# Patient Record
Sex: MALE | Race: WHITE | NOT HISPANIC OR LATINO | Employment: FULL TIME | URBAN - METROPOLITAN AREA
[De-identification: names, ages, dates, MRNs, and addresses within clinical notes are randomized per-mention and may not be internally consistent; named-entity substitution may affect disease eponyms.]

---

## 2020-01-06 ENCOUNTER — HOSPITAL ENCOUNTER (EMERGENCY)
Facility: CLINIC | Age: 30
Discharge: HOME | End: 2020-01-06
Attending: FAMILY MEDICINE | Admitting: FAMILY MEDICINE
Payer: COMMERCIAL

## 2020-01-06 VITALS
TEMPERATURE: 98.2 F | WEIGHT: 260 LBS | RESPIRATION RATE: 16 BRPM | HEART RATE: 103 BPM | OXYGEN SATURATION: 97 % | SYSTOLIC BLOOD PRESSURE: 156 MMHG | DIASTOLIC BLOOD PRESSURE: 110 MMHG

## 2020-01-06 DIAGNOSIS — E03.9 HYPOTHYROIDISM, UNSPECIFIED TYPE: ICD-10-CM

## 2020-01-06 DIAGNOSIS — M25.50 POLYARTHRALGIA: ICD-10-CM

## 2020-01-06 LAB
ALBUMIN SERPL-MCNC: 4.4 G/DL (ref 3.4–5)
ALBUMIN UR-MCNC: NEGATIVE MG/DL
ALP SERPL-CCNC: 90 U/L (ref 40–150)
ALT SERPL W P-5'-P-CCNC: 46 U/L (ref 0–70)
ANION GAP SERPL CALCULATED.3IONS-SCNC: 5 MMOL/L (ref 3–14)
APPEARANCE UR: CLEAR
AST SERPL W P-5'-P-CCNC: 28 U/L (ref 0–45)
BASOPHILS # BLD AUTO: 0 10E9/L (ref 0–0.2)
BASOPHILS NFR BLD AUTO: 0.2 %
BILIRUB SERPL-MCNC: 0.5 MG/DL (ref 0.2–1.3)
BILIRUB UR QL STRIP: NEGATIVE
BUN SERPL-MCNC: 15 MG/DL (ref 7–30)
CALCIUM SERPL-MCNC: 9.3 MG/DL (ref 8.5–10.1)
CHLORIDE SERPL-SCNC: 106 MMOL/L (ref 94–109)
CO2 SERPL-SCNC: 26 MMOL/L (ref 20–32)
COLOR UR AUTO: YELLOW
CREAT SERPL-MCNC: 0.95 MG/DL (ref 0.66–1.25)
CRP SERPL-MCNC: 13.3 MG/L (ref 0–8)
DEPRECATED S PYO AG THROAT QL EIA: NORMAL
DIFFERENTIAL METHOD BLD: NORMAL
EOSINOPHIL # BLD AUTO: 0 10E9/L (ref 0–0.7)
EOSINOPHIL NFR BLD AUTO: 0.5 %
ERYTHROCYTE [DISTWIDTH] IN BLOOD BY AUTOMATED COUNT: 12.5 % (ref 10–15)
ERYTHROCYTE [SEDIMENTATION RATE] IN BLOOD BY WESTERGREN METHOD: 14 MM/H (ref 0–15)
GFR SERPL CREATININE-BSD FRML MDRD: >90 ML/MIN/{1.73_M2}
GLUCOSE SERPL-MCNC: 100 MG/DL (ref 70–99)
GLUCOSE UR STRIP-MCNC: NEGATIVE MG/DL
HCT VFR BLD AUTO: 43.5 % (ref 40–53)
HGB BLD-MCNC: 14.9 G/DL (ref 13.3–17.7)
HGB UR QL STRIP: ABNORMAL
IMM GRANULOCYTES # BLD: 0 10E9/L (ref 0–0.4)
IMM GRANULOCYTES NFR BLD: 0.3 %
KETONES UR STRIP-MCNC: NEGATIVE MG/DL
LACTATE BLD-SCNC: 0.8 MMOL/L (ref 0.7–2)
LEUKOCYTE ESTERASE UR QL STRIP: NEGATIVE
LYMPHOCYTES # BLD AUTO: 1.6 10E9/L (ref 0.8–5.3)
LYMPHOCYTES NFR BLD AUTO: 24.9 %
MCH RBC QN AUTO: 30.7 PG (ref 26.5–33)
MCHC RBC AUTO-ENTMCNC: 34.3 G/DL (ref 31.5–36.5)
MCV RBC AUTO: 90 FL (ref 78–100)
MONOCYTES # BLD AUTO: 0.6 10E9/L (ref 0–1.3)
MONOCYTES NFR BLD AUTO: 8.4 %
MUCOUS THREADS #/AREA URNS LPF: PRESENT /LPF
NEUTROPHILS # BLD AUTO: 4.3 10E9/L (ref 1.6–8.3)
NEUTROPHILS NFR BLD AUTO: 65.7 %
NITRATE UR QL: NEGATIVE
NRBC # BLD AUTO: 0 10*3/UL
NRBC BLD AUTO-RTO: 0 /100
PH UR STRIP: 5 PH (ref 5–7)
PLATELET # BLD AUTO: 214 10E9/L (ref 150–450)
POTASSIUM SERPL-SCNC: 3.9 MMOL/L (ref 3.4–5.3)
PROT SERPL-MCNC: 8.3 G/DL (ref 6.8–8.8)
RBC # BLD AUTO: 4.86 10E12/L (ref 4.4–5.9)
RBC #/AREA URNS AUTO: 1 /HPF (ref 0–2)
SODIUM SERPL-SCNC: 137 MMOL/L (ref 133–144)
SOURCE: ABNORMAL
SP GR UR STRIP: 1.02 (ref 1–1.03)
SPECIMEN SOURCE: NORMAL
SQUAMOUS #/AREA URNS AUTO: <1 /HPF (ref 0–1)
T4 FREE SERPL-MCNC: 0.74 NG/DL (ref 0.76–1.46)
TSH SERPL DL<=0.005 MIU/L-ACNC: 16.4 MU/L (ref 0.4–4)
URATE SERPL-MCNC: 4.6 MG/DL (ref 3.5–7.2)
UROBILINOGEN UR STRIP-MCNC: 0 MG/DL (ref 0–2)
WBC # BLD AUTO: 6.6 10E9/L (ref 4–11)
WBC #/AREA URNS AUTO: <1 /HPF (ref 0–5)

## 2020-01-06 PROCEDURE — 87081 CULTURE SCREEN ONLY: CPT | Performed by: FAMILY MEDICINE

## 2020-01-06 PROCEDURE — 99284 EMERGENCY DEPT VISIT MOD MDM: CPT

## 2020-01-06 PROCEDURE — 83605 ASSAY OF LACTIC ACID: CPT | Performed by: FAMILY MEDICINE

## 2020-01-06 PROCEDURE — 86140 C-REACTIVE PROTEIN: CPT | Performed by: FAMILY MEDICINE

## 2020-01-06 PROCEDURE — 85652 RBC SED RATE AUTOMATED: CPT | Performed by: FAMILY MEDICINE

## 2020-01-06 PROCEDURE — 86431 RHEUMATOID FACTOR QUANT: CPT | Performed by: FAMILY MEDICINE

## 2020-01-06 PROCEDURE — 87880 STREP A ASSAY W/OPTIC: CPT | Performed by: FAMILY MEDICINE

## 2020-01-06 PROCEDURE — 86376 MICROSOMAL ANTIBODY EACH: CPT | Performed by: FAMILY MEDICINE

## 2020-01-06 PROCEDURE — 87040 BLOOD CULTURE FOR BACTERIA: CPT | Mod: XS | Performed by: FAMILY MEDICINE

## 2020-01-06 PROCEDURE — 84550 ASSAY OF BLOOD/URIC ACID: CPT | Performed by: FAMILY MEDICINE

## 2020-01-06 PROCEDURE — 84439 ASSAY OF FREE THYROXINE: CPT | Performed by: FAMILY MEDICINE

## 2020-01-06 PROCEDURE — 81001 URINALYSIS AUTO W/SCOPE: CPT | Performed by: FAMILY MEDICINE

## 2020-01-06 PROCEDURE — 85025 COMPLETE CBC W/AUTO DIFF WBC: CPT | Performed by: FAMILY MEDICINE

## 2020-01-06 PROCEDURE — 86747 PARVOVIRUS ANTIBODY: CPT | Performed by: FAMILY MEDICINE

## 2020-01-06 PROCEDURE — 86038 ANTINUCLEAR ANTIBODIES: CPT | Performed by: FAMILY MEDICINE

## 2020-01-06 PROCEDURE — 84443 ASSAY THYROID STIM HORMONE: CPT | Performed by: FAMILY MEDICINE

## 2020-01-06 PROCEDURE — 86039 ANTINUCLEAR ANTIBODIES (ANA): CPT | Performed by: FAMILY MEDICINE

## 2020-01-06 PROCEDURE — 80053 COMPREHEN METABOLIC PANEL: CPT | Performed by: FAMILY MEDICINE

## 2020-01-06 PROCEDURE — 99284 EMERGENCY DEPT VISIT MOD MDM: CPT | Mod: Z6 | Performed by: FAMILY MEDICINE

## 2020-01-06 RX ORDER — LEVOTHYROXINE SODIUM 88 UG/1
88 TABLET ORAL DAILY
Qty: 30 TABLET | Refills: 0 | Status: SHIPPED | OUTPATIENT
Start: 2020-01-06 | End: 2020-02-05

## 2020-01-06 ASSESSMENT — ENCOUNTER SYMPTOMS
DIAPHORESIS: 0
HEADACHES: 0
DIARRHEA: 0
SORE THROAT: 1
CONSTIPATION: 0
BLOOD IN STOOL: 0
SHORTNESS OF BREATH: 0
FREQUENCY: 0
WHEEZING: 0
FATIGUE: 1
SINUS PRESSURE: 0
FEVER: 0
PALPITATIONS: 0
ARTHRALGIAS: 1
VOMITING: 0
COUGH: 0
NAUSEA: 0
CHILLS: 1
DYSURIA: 0
ABDOMINAL PAIN: 0

## 2020-01-06 NOTE — ED AVS SNAPSHOT
[facility]  [address] [address]  Phone:  [phone]  Fax:  [phone]                                    Rishi Mata   MRN: [id number]    Department:  [facility]   Date of Visit:  1/6/2020           After Visit Summary Signature Page    I have received my discharge instructions, and my questions have been answered. I have discussed any challenges I see with this plan with the nurse or doctor.    ..........................................................................................................................................  Patient/Patient Representative Signature      ..........................................................................................................................................  Patient Representative Print Name and Relationship to Patient    ..................................................               ................................................  Date                                   Time    ..........................................................................................................................................  Reviewed by Signature/Title    ...................................................              ..............................................  Date                                               Time          22EPIC Rev 08/18

## 2020-01-07 LAB
ANA PAT SER IF-IMP: ABNORMAL
ANA SER QL IF: POSITIVE
ANA TITR SER IF: ABNORMAL {TITER}
RHEUMATOID FACT SER NEPH-ACNC: NORMAL IU/ML (ref 0–20)
THYROPEROXIDASE AB SERPL-ACNC: <10 IU/ML

## 2020-01-07 NOTE — ED PROVIDER NOTES
History     Chief Complaint   Patient presents with     Joint Swelling     joint pain and swelling since Dec 30th, sx getting worse, increasing pain     HPI  Rishi Mata is a 29 year old male who presents with joint pain affecting the wrists, shoulders, elbows, knees, ankles - onset dec 30th after flu-like illness dec 20th - chills, but no fever.  no URI symptoms at the time. chronically fatigued, works long hours.  no persistent cold intolerance.  no change in bowel habits.      He has noted that the wrist and MCP joints feel particularly tight and more difficult to close with a sense of joint swelling in these particular joints without overlying erythema.    No known history of gouty arthritis, rheumatoid arthritis, lupus.  He is not on no medications.  No known Lyme's disease or risk factors given middle of winter.  No travel history.  Denies STD exposures such as gonorrhea or syphilis.  No history of diabetes heart disease lung disease hepatitis other underlying systemic conditions.  Denies associated fever.  No skin lesions.  No rashes been noted.   He has no known exposures to ill children (e.g. Parvovirus B19)  pharyngitis present for 5 days and resolved 2-3 days ago. was not seen for this.  occl alcohol. No drugs including no IV drugs.      Allergies:  No Known Allergies    Problem List:    There are no active problems to display for this patient.       Past Medical History:    No past medical history on file.    Past Surgical History:    No past surgical history on file.    Family History:    No family history on file.    Social History:  Marital Status:  Single [1]  Social History     Tobacco Use     Smoking status: Not on file   Substance Use Topics     Alcohol use: Not on file     Drug use: Not on file        Medications:    No current outpatient medications on file.        Review of Systems   Constitutional: Positive for chills and fatigue. Negative for diaphoresis and fever.   HENT: Positive for sore  throat. Negative for ear pain and sinus pressure.    Eyes: Negative for visual disturbance.   Respiratory: Negative for cough, shortness of breath and wheezing.    Cardiovascular: Negative for chest pain and palpitations.   Gastrointestinal: Negative for abdominal pain, blood in stool, constipation, diarrhea, nausea and vomiting.   Genitourinary: Negative for dysuria, frequency and urgency.   Musculoskeletal: Positive for arthralgias.   Skin: Negative for rash.   Neurological: Negative for headaches.   All other systems reviewed and are negative.      Physical Exam   BP: (!) 149/108  Pulse: 79  Temp: 98.1  F (36.7  C)  Resp: 16  Weight: 117.9 kg (260 lb)  SpO2: 97 %      Physical Exam  Constitutional:       General: He is in acute distress.   HENT:      Nose: Nose normal. No rhinorrhea.      Mouth/Throat:      Mouth: Mucous membranes are moist.      Pharynx: Posterior oropharyngeal erythema (mild) present.   Eyes:      Conjunctiva/sclera: Conjunctivae normal.   Neck:      Musculoskeletal: Normal range of motion and neck supple.      Thyroid: No thyromegaly or thyroid tenderness.   Cardiovascular:      Rate and Rhythm: Normal rate and regular rhythm.      Heart sounds: No murmur. No friction rub. No gallop.    Pulmonary:      Effort: Pulmonary effort is normal. No respiratory distress.      Breath sounds: Normal breath sounds. No stridor. No wheezing or rhonchi.   Abdominal:      General: Abdomen is flat. Bowel sounds are normal. There is no distension.      Palpations: There is no mass.      Tenderness: There is no abdominal tenderness.      Hernia: No hernia is present.   Musculoskeletal:      Right lower leg: No edema.      Left lower leg: No edema.   Skin:     Coloration: Skin is not pale.      Findings: No rash.   Neurological:      Mental Status: He is alert.       The patient appears to have reduced range of motion on  but on my exam this does not appear to be focal in any of the joints but more  generalized.  He however notes more swelling around the second MCP joint on bilateral hands.  There may be some doughy feeling around this joint as compared with others but this would be minimal.  I see no overlying erythema or rash over the dorsal surface of the knuckles.  Erythematous none of the joints appear to be.  There is no psoriatic plaque.  The wrist similarly do not demonstrate obvious significant swelling although landmarks are more difficult to visualize.  He appears to be more uncomfortable on ambulation with some pain at the knees bilaterally.  No distal skin lesions on the extremities such as Janeway lesions.          ED Course        Procedures               Critical Care time:  none               Results for orders placed or performed during the hospital encounter of 01/06/20 (from the past 24 hour(s))   Blood culture   Result Value Ref Range    Specimen Description Blood Right Arm     Special Requests Aerobic and anaerobic bottles received     Culture Micro No growth after 7 hours    Blood culture   Result Value Ref Range    Specimen Description Blood Left Arm     Special Requests Aerobic and anaerobic bottles received     Culture Micro No growth after 7 hours    CBC with platelets differential   Result Value Ref Range    WBC 6.6 4.0 - 11.0 10e9/L    RBC Count 4.86 4.4 - 5.9 10e12/L    Hemoglobin 14.9 13.3 - 17.7 g/dL    Hematocrit 43.5 40.0 - 53.0 %    MCV 90 78 - 100 fl    MCH 30.7 26.5 - 33.0 pg    MCHC 34.3 31.5 - 36.5 g/dL    RDW 12.5 10.0 - 15.0 %    Platelet Count 214 150 - 450 10e9/L    Diff Method Automated Method     % Neutrophils 65.7 %    % Lymphocytes 24.9 %    % Monocytes 8.4 %    % Eosinophils 0.5 %    % Basophils 0.2 %    % Immature Granulocytes 0.3 %    Nucleated RBCs 0 0 /100    Absolute Neutrophil 4.3 1.6 - 8.3 10e9/L    Absolute Lymphocytes 1.6 0.8 - 5.3 10e9/L    Absolute Monocytes 0.6 0.0 - 1.3 10e9/L    Absolute Eosinophils 0.0 0.0 - 0.7 10e9/L    Absolute Basophils 0.0 0.0  - 0.2 10e9/L    Abs Immature Granulocytes 0.0 0 - 0.4 10e9/L    Absolute Nucleated RBC 0.0    Comprehensive metabolic panel   Result Value Ref Range    Sodium 137 133 - 144 mmol/L    Potassium 3.9 3.4 - 5.3 mmol/L    Chloride 106 94 - 109 mmol/L    Carbon Dioxide 26 20 - 32 mmol/L    Anion Gap 5 3 - 14 mmol/L    Glucose 100 (H) 70 - 99 mg/dL    Urea Nitrogen 15 7 - 30 mg/dL    Creatinine 0.95 0.66 - 1.25 mg/dL    GFR Estimate >90 >60 mL/min/[1.73_m2]    GFR Estimate If Black >90 >60 mL/min/[1.73_m2]    Calcium 9.3 8.5 - 10.1 mg/dL    Bilirubin Total 0.5 0.2 - 1.3 mg/dL    Albumin 4.4 3.4 - 5.0 g/dL    Protein Total 8.3 6.8 - 8.8 g/dL    Alkaline Phosphatase 90 40 - 150 U/L    ALT 46 0 - 70 U/L    AST 28 0 - 45 U/L   Erythrocyte sedimentation rate auto   Result Value Ref Range    Sed Rate 14 0 - 15 mm/h   CRP Inflammation   Result Value Ref Range    CRP Inflammation 13.3 (H) 0.0 - 8.0 mg/L   Uric acid   Result Value Ref Range    Uric Acid 4.6 3.5 - 7.2 mg/dL   TSH with free T4 reflex   Result Value Ref Range    TSH 16.40 (H) 0.40 - 4.00 mU/L   Lactic acid whole blood   Result Value Ref Range    Lactic Acid 0.8 0.7 - 2.0 mmol/L   T4 free   Result Value Ref Range    T4 Free 0.74 (L) 0.76 - 1.46 ng/dL   Rapid Strep Screen   Result Value Ref Range    Specimen Description Throat     Rapid Strep A Screen       NEGATIVE: No Group A streptococcal antigen detected by immunoassay, await culture report.   Beta strep group A culture   Result Value Ref Range    Specimen Description Throat     Special Requests Specimen collected in eSwab transport (white cap)     Culture Micro PENDING    UA with Microscopic   Result Value Ref Range    Color Urine Yellow     Appearance Urine Clear     Glucose Urine Negative NEG^Negative mg/dL    Bilirubin Urine Negative NEG^Negative    Ketones Urine Negative NEG^Negative mg/dL    Specific Gravity Urine 1.020 1.003 - 1.035    Blood Urine Small (A) NEG^Negative    pH Urine 5.0 5.0 - 7.0 pH    Protein  Albumin Urine Negative NEG^Negative mg/dL    Urobilinogen mg/dL 0.0 0.0 - 2.0 mg/dL    Nitrite Urine Negative NEG^Negative    Leukocyte Esterase Urine Negative NEG^Negative    Source Unspecified Urine     WBC Urine <1 0 - 5 /HPF    RBC Urine 1 0 - 2 /HPF    Squamous Epithelial /HPF Urine <1 0 - 1 /HPF    Mucous Urine Present (A) NEG^Negative /LPF       Medications - No data to display    Assessments & Plan (with Medical Decision Making)     MDM: Rishi Mata is a 29 year old male who presented with polyarthritis/polyarthraligia symptoms -and especially over the wrist and MCP joints but also involving the shoulders, knees, elbows, ankles.  He did have recent illness that was brief in mid December and also pharyngitis recently but no other significant systemic symptoms.  Does note fatigue chronically that he attributes to his hours at work.  He has no underlying rheumatic conditions such as rheumatoid arthritis, lupus.  No underlying systemic conditions such as psoriasis, diabetes heart or lung disease.  No obvious exposures to STD such as gonorrhea, hiv or syphilis.  When the middle of winter making Lyme's less likely - I did not due lymes testing but could be considered, but I did not add this once I had the elevated TSH, low T4.    He has had recent febrile illness and pharyngitis and strep is negative.  He has no travel history.  Denies illicit drugs.      a broad-based evaluation was initiated and he does have an increased TSH to 16 and a suppressed T4 below the normal range.  This could be consistent with a Hashimoto's thyroiditis which can also be associated with rheumatic conditions including rheumatoid arthritis.   I therefore added a peroxidase antibody to evaluate for Hashimotos.  I have initiated Synthroid at 88 mcg which is approximately 1.6 mcg/kg.   This could be responsible for all of his symptoms.  However he has no other thyroid related therefore history and I have asked him to follow-up later this  week with primary care for when the remainder of his labs are back and they can at that time decide if he is to follow-up with endocrinology or with rheumatology.           I have reviewed the nursing notes.    I have reviewed the findings, diagnosis, plan and need for follow up with the patient.       New Prescriptions    No medications on file       Final diagnoses:   Polyarthralgia - this appears related to low thyroid - possibly due to hashimotos thyroiditis which can be associated with arthritis conditions.   start synthroid and follow-up clinic within 1 week.  after labs are fully back, primary provider can decide whether you should see rheumatology or endocrinology.   Hypothyroidism, unspecified type       1/6/2020   [facility] EMERGENCY DEPARTMENT     Andres Almonte MD  01/07/20 1042

## 2020-01-07 NOTE — DISCHARGE INSTRUCTIONS
ICD-10-CM    1. Polyarthralgia M25.50     this appears related to low thyroid - possibly due to hashimotos thyroiditis which can be associated with arthritis conditions.   start synthroid and follow-up clinic within 1 week.  after labs are fully back, primary provider can decide whether you should see rheumatology or endocrinology.   2. Hypothyroidism, unspecified type E03.9

## 2020-01-07 NOTE — ED TRIAGE NOTES
Pt here via private vehicle for evaluation of joint pain and swelling. Reports joint pain starting new years and then swelling on Friday. Denies ever having this before. No PMH. Denies other associated sx.

## 2020-01-08 LAB — B19V IGM SER IA-ACNC: 0.14 IV

## 2020-01-08 NOTE — RESULT ENCOUNTER NOTE
Appt with Paula Barrow, AZALIA CNP on 1/10/2020.  Routed result    Flaquito Ballard RN  [facility]  Emergency Dept Lab Result RN  Ph# [phone]

## 2020-01-09 LAB
BACTERIA SPEC CULT: NORMAL
Lab: NORMAL
SPECIMEN SOURCE: NORMAL

## 2020-01-09 NOTE — RESULT ENCOUNTER NOTE
Final Beta strep group A r/o culture is NEGATIVE for Group A streptococcus.    No treatment or change in treatment per [place] Strep protocol.

## 2020-01-10 ENCOUNTER — OFFICE VISIT (OUTPATIENT)
Dept: FAMILY MEDICINE | Facility: CLINIC | Age: 30
End: 2020-01-10
Payer: COMMERCIAL

## 2020-01-10 VITALS
HEIGHT: 73 IN | HEART RATE: 65 BPM | BODY MASS INDEX: 34.59 KG/M2 | SYSTOLIC BLOOD PRESSURE: 120 MMHG | DIASTOLIC BLOOD PRESSURE: 78 MMHG | OXYGEN SATURATION: 98 % | TEMPERATURE: 96.8 F | WEIGHT: 261 LBS

## 2020-01-10 DIAGNOSIS — M06.4 INFLAMMATORY POLYARTHROPATHY (H): Primary | ICD-10-CM

## 2020-01-10 LAB
T4 FREE SERPL-MCNC: 0.78 NG/DL (ref 0.76–1.46)
TSH SERPL DL<=0.005 MIU/L-ACNC: 8.53 MU/L (ref 0.4–4)

## 2020-01-10 PROCEDURE — 84443 ASSAY THYROID STIM HORMONE: CPT | Performed by: NURSE PRACTITIONER

## 2020-01-10 PROCEDURE — 84439 ASSAY OF FREE THYROXINE: CPT | Performed by: NURSE PRACTITIONER

## 2020-01-10 PROCEDURE — 99213 OFFICE O/P EST LOW 20 MIN: CPT | Performed by: NURSE PRACTITIONER

## 2020-01-10 PROCEDURE — 36415 COLL VENOUS BLD VENIPUNCTURE: CPT | Performed by: NURSE PRACTITIONER

## 2020-01-10 ASSESSMENT — ENCOUNTER SYMPTOMS
ENDOCRINE NEGATIVE: 1
RESPIRATORY NEGATIVE: 1
JOINT SWELLING: 1
CARDIOVASCULAR NEGATIVE: 1
NECK STIFFNESS: 1
ARTHRALGIAS: 1
FATIGUE: 1

## 2020-01-10 ASSESSMENT — MIFFLIN-ST. JEOR: SCORE: 2192.77

## 2020-01-10 NOTE — PATIENT INSTRUCTIONS
1. Recheck thyroid labs today. We'll call you with results.  2. Rheumatology referral sent to 2 clinics.   3. Continue synthroid as prescribed.  4. Follow-up if symptoms worsen or do not start to improve.

## 2020-01-10 NOTE — PROGRESS NOTES
"Subjective     Rishi Mata is a 29 year old male who presents to clinic today for the following health issues:    HPI   ED/UC Followup:    Facility:  [facility]  Date of visit: 1/6/2020  Reason for visit: joint pains/ polyarthralgia  Hands, knees, ankles, shoulders  Current Status: the same as Monday-  Patient states this has been a bad week  Please review blood test results.  Started levothyroxine Rx'd 1/6- is taking every day     In addition: knees, hands, feet, ankles, hips pain and swelling. Had been sore before in the past, but not like this.      There is no problem list on file for this patient.    History reviewed. No pertinent surgical history.    Social History     Tobacco Use     Smoking status: Never Smoker     Smokeless tobacco: Former User     Types: Chew   Substance Use Topics     Alcohol use: Yes     Comment: 2 beers per month     History reviewed. No pertinent family history.      Current Outpatient Medications   Medication Sig Dispense Refill     levothyroxine (SYNTHROID/LEVOTHROID) 88 MCG tablet Take 1 tablet (88 mcg) by mouth daily 30 tablet 0     No Known Allergies  BP Readings from Last 3 Encounters:   01/10/20 120/78   01/06/20 (!) 156/110    Wt Readings from Last 3 Encounters:   01/10/20 118.4 kg (261 lb)   01/06/20 117.9 kg (260 lb)            Reviewed and updated as needed this visit by Provider  Paula Barrow, SHANAE, NP-C 1/10/2020 4:21 PM            Review of Systems   Constitutional: Positive for fatigue.   Respiratory: Negative.    Cardiovascular: Negative.    Endocrine: Negative.    Musculoskeletal: Positive for arthralgias, joint swelling and neck stiffness.        Knees, hands, feet, ankles, hips are painful at times            Objective    /78 (BP Location: Right arm, Cuff Size: Adult Large)   Pulse 65   Temp 96.8  F (36  C) (Tympanic)   Ht 1.854 m (6' 1\")   Wt 118.4 kg (261 lb)   SpO2 98%   BMI 34.43 kg/m    Body mass index is 34.43 kg/m .  Physical Exam "       Diagnostic Test Results:  Labs reviewed in Epic  No results found for this or any previous visit (from the past 24 hour(s)).        Assessment & Plan     1. Inflammatory polyarthropathy (H)  Slight improvement in symptoms today. Recheck TSH to see if synthroid is helping. Will call with results. Recommended he establish care with PCP; options given. Referral to rheumatology made. Continue synthroid as prescribed.     - TSH with free T4 reflex  - RHEUMATOLOGY REFERRAL       Return in about 1 week (around 1/17/2020), or if symptoms worsen or fail to improve.    AZALIA [facility]

## 2020-01-12 LAB
BACTERIA SPEC CULT: NO GROWTH
BACTERIA SPEC CULT: NO GROWTH
Lab: NORMAL
Lab: NORMAL
SPECIMEN SOURCE: NORMAL
SPECIMEN SOURCE: NORMAL

## 2020-01-22 ENCOUNTER — OFFICE VISIT (OUTPATIENT)
Dept: FAMILY MEDICINE | Facility: CLINIC | Age: 30
End: 2020-01-22
Payer: COMMERCIAL

## 2020-01-22 VITALS
SYSTOLIC BLOOD PRESSURE: 134 MMHG | DIASTOLIC BLOOD PRESSURE: 71 MMHG | WEIGHT: 258 LBS | OXYGEN SATURATION: 98 % | HEIGHT: 73 IN | HEART RATE: 71 BPM | BODY MASS INDEX: 34.19 KG/M2

## 2020-01-22 DIAGNOSIS — E03.9 HYPOTHYROIDISM, UNSPECIFIED TYPE: ICD-10-CM

## 2020-01-22 DIAGNOSIS — M06.4 INFLAMMATORY POLYARTHROPATHY (H): Primary | ICD-10-CM

## 2020-01-22 PROCEDURE — 99214 OFFICE O/P EST MOD 30 MIN: CPT | Performed by: FAMILY MEDICINE

## 2020-01-22 RX ORDER — PREDNISONE 20 MG/1
TABLET ORAL
Qty: 20 TABLET | Refills: 0 | Status: SHIPPED | OUTPATIENT
Start: 2020-01-22

## 2020-01-22 ASSESSMENT — MIFFLIN-ST. JEOR: SCORE: 2179.16

## 2020-01-22 NOTE — PROGRESS NOTES
Subjective     Rishi Mata is a 29 year old male who presents to clinic today for the following health issues:    HPI   ED/UC Followup:    Facility:  [facility]  Date of visit: 1/10/20  Reason for visit: Polyarthralgia   Current Status: getting worse, in a lot of pain- would like to be tested for lymes disease     Establish Care    Joint Pain    Onset: about 3 months ago    Description:   Location: left shoulder, right shoulder, left elbow, right elbow, left wrist, right wrist, left knee, right knee, left hip, right hip, left ankle, right ankle and all finger joints  Character: Sharp, Dull ache and Gnawing    Intensity: severe    Progression of Symptoms: same    Accompanying Signs & Symptoms:  Other symptoms: swelling; wakes up in the morning all fingers curled flexed and difficult to move - gets better as days progresses    History:   Previous similar pain: no       Precipitating factors:   Trauma or overuse: no     Alleviating factors:  Improved by: nothing    Therapies Tried and outcome: no treatment for the inflammation or pain yet.    Patient was started on levothyroxine 2 weeks ago.        There is no problem list on file for this patient.    History reviewed. No pertinent surgical history.    Social History     Tobacco Use     Smoking status: Never Smoker     Smokeless tobacco: Former User     Types: Chew   Substance Use Topics     Alcohol use: Yes     Comment: 2 beers per month     History reviewed. No pertinent family history.      Current Outpatient Medications   Medication Sig Dispense Refill     levothyroxine (SYNTHROID/LEVOTHROID) 88 MCG tablet Take 1 tablet (88 mcg) by mouth daily 30 tablet 0     predniSONE (DELTASONE) 20 MG tablet Take 3 tabs by mouth daily x 3 days, then 2 tabs daily x 3 days, then 1 tab daily x 3 days, then 1/2 tab daily x 3 days. 20 tablet 0     No Known Allergies      Reviewed and updated as needed this visit by Provider         Review of Systems   ROS COMP: Constitutional, HEENT,  "cardiovascular, pulmonary, GI, , musculoskeletal, neuro, skin, endocrine and psych systems are negative, except as otherwise noted.      Objective    /71 (BP Location: Right arm, Patient Position: Sitting, Cuff Size: Adult Large)   Pulse 71   Ht 1.854 m (6' 1\")   Wt 117 kg (258 lb)   SpO2 98%   BMI 34.04 kg/m    Body mass index is 34.04 kg/m .  Physical Exam   GEN: alert, oriented x 3, NAD, ambulatory with no assist, appears uncomfortable walking  NECK: no swelling; diffuse SCM TTP bilaterally, thyroid not palpable and non-tender  MUSC: mild swelling of all MCP and IPJs with moderate TTP; TTP of bilateral shoulders, elbows, hips, knees, ankles, MTPs; no finger nodules; no tophi  SKIN: no rash      Diagnostic Test Results:  Labs reviewed in Epic        Assessment & Plan     Rishi was seen today for er f/u.    Diagnoses and all orders for this visit:    Inflammatory polyarthropathy (H)  -     predniSONE (DELTASONE) 20 MG tablet; Take 3 tabs by mouth daily x 3 days, then 2 tabs daily x 3 days, then 1 tab daily x 3 days, then 1/2 tab daily x 3 days.  -     RHEUMATOLOGY REFERRAL  Patient was advised based on hi shistory, findings on exam, and positive KATHRYN, he has inflammatory polyarthritis.  DDx: seronegative RA, lupus, other autoimmune disorder, sjorgen's syndrome.  Patient has not been given any prednisone or antiinflammatory treatment to date. Hence, expected his pain persists.  Discussed use of prednisone, including benefits in his condition, and posible ADR to med. He concurred to proceed with treatment.  Consult rheumatology.  Patient asked about lyme disease testing - advised patient his clinical picture is not highly suspect or consistent with lyme disease. Testing will be deferred.  Return precautions discussed and given to patient.    Hypothyroidism, unspecified type  -     TSH; Future  -     T4 FREE; Future  Check labs 1 month after he started med.  Consider referral to rheumatology.  This is likely " a separate condition from the above, or could be concurrent result of an autoimmune process?  Consider testing thyroid autoantibodies. Consider thyroid US - thyroid non-palpable and non-tender today.  Return precautions discussed and given to patient.           Patient Instructions   Start prednisone to treat the inflammatory arthritis.    If not better after 1 week, see provider again.        No follow-ups on file.    Chong De Paz MD  [facility]

## 2020-01-22 NOTE — PATIENT INSTRUCTIONS
Start prednisone to treat the inflammatory arthritis.    If not better after 1 week, see provider again.

## 2020-01-23 ENCOUNTER — TELEPHONE (OUTPATIENT)
Dept: RHEUMATOLOGY | Facility: CLINIC | Age: 30
End: 2020-01-23

## 2020-01-23 NOTE — TELEPHONE ENCOUNTER
[facility] Call Center    Phone Message    May a detailed message be left on voicemail: yes    Reason for Call: Other: Pt has incoming referral for  Inflammatory polyarthropathy. Pt was explained the intake process. Please advise     Action Taken: Message routed to:  [facility] ([facility] Rheum

## 2020-01-24 NOTE — TELEPHONE ENCOUNTER
Patient is referred by Chong De Paz for inflammatory polyarthropathy.     Per referring OV notes, patient complains of joint pain for 3 months in multiple areas and describes as a dull ache.     There is no swelling upon exam.    Component      Latest Ref Rng & Units 1/6/2020   KATHRYN interpretation      NEG:Negative Positive (A)   KATHRYN pattern 1       HOMOGENEOUS   KATHRYN titer 1       1:640   Sed Rate      0 - 15 mm/h 14   CRP Inflammation      0.0 - 8.0 mg/L 13.3 (H)   Rheumatoid Factor      <12 IU/mL <20. REFERENCE RANGE: <20. ASSAYED BY ALTERNATE METHOD.   Uric Acid      3.5 - 7.2 mg/dL 4.6     Sending chart to Rheumatologist for review process which can take 1-2 weeks.     Violeta Gastelum, MILVIA   1/24/2020 3:18 PM

## 2020-01-27 PROBLEM — E03.9 HYPOTHYROIDISM, UNSPECIFIED TYPE: Status: ACTIVE | Noted: 2020-01-27

## 2020-01-27 NOTE — TELEPHONE ENCOUNTER
Per Rheumatologist review, ok to schedule patient with Abhilash. Task sent to schedulers to contact patient to offer an appointment.     Violeta Gastelum CMA   1/27/2020 8:59 AM

## 2020-01-30 ENCOUNTER — TRANSFERRED RECORDS (OUTPATIENT)
Dept: HEALTH INFORMATION MANAGEMENT | Facility: CLINIC | Age: 30
End: 2020-01-30

## 2020-02-03 DIAGNOSIS — E03.9 HYPOTHYROIDISM, UNSPECIFIED TYPE: ICD-10-CM

## 2020-02-03 LAB
T4 FREE SERPL-MCNC: 0.97 NG/DL (ref 0.76–1.46)
TSH SERPL DL<=0.005 MIU/L-ACNC: 3.34 MU/L (ref 0.4–4)

## 2020-02-03 PROCEDURE — 84439 ASSAY OF FREE THYROXINE: CPT | Performed by: FAMILY MEDICINE

## 2020-02-03 PROCEDURE — 36415 COLL VENOUS BLD VENIPUNCTURE: CPT | Performed by: FAMILY MEDICINE

## 2020-02-03 PROCEDURE — 84443 ASSAY THYROID STIM HORMONE: CPT | Performed by: FAMILY MEDICINE

## 2020-02-17 DIAGNOSIS — M12.89 TRANSIENT ARTHROPATHY, MULTIPLE SITES: ICD-10-CM

## 2020-02-17 DIAGNOSIS — Z79.52 LONG TERM CURRENT USE OF SYSTEMIC STEROIDS: Primary | ICD-10-CM

## 2020-02-17 LAB
ALT SERPL W P-5'-P-CCNC: 52 U/L (ref 0–70)
AST SERPL W P-5'-P-CCNC: 25 U/L (ref 0–45)
BASOPHILS # BLD AUTO: 0 10E9/L (ref 0–0.2)
BASOPHILS NFR BLD AUTO: 0.2 %
CREAT SERPL-MCNC: 0.94 MG/DL (ref 0.66–1.25)
CRP SERPL-MCNC: 9.9 MG/L (ref 0–8)
DIFFERENTIAL METHOD BLD: ABNORMAL
EOSINOPHIL # BLD AUTO: 0 10E9/L (ref 0–0.7)
EOSINOPHIL NFR BLD AUTO: 0 %
ERYTHROCYTE [DISTWIDTH] IN BLOOD BY AUTOMATED COUNT: 12.6 % (ref 10–15)
GFR SERPL CREATININE-BSD FRML MDRD: >90 ML/MIN/{1.73_M2}
HCT VFR BLD AUTO: 44.7 % (ref 40–53)
HGB BLD-MCNC: 15 G/DL (ref 13.3–17.7)
IMM GRANULOCYTES # BLD: 0.1 10E9/L (ref 0–0.4)
IMM GRANULOCYTES NFR BLD: 0.4 %
LYMPHOCYTES # BLD AUTO: 0.7 10E9/L (ref 0.8–5.3)
LYMPHOCYTES NFR BLD AUTO: 6 %
MCH RBC QN AUTO: 30.4 PG (ref 26.5–33)
MCHC RBC AUTO-ENTMCNC: 33.6 G/DL (ref 31.5–36.5)
MCV RBC AUTO: 91 FL (ref 78–100)
MONOCYTES # BLD AUTO: 0.4 10E9/L (ref 0–1.3)
MONOCYTES NFR BLD AUTO: 3 %
NEUTROPHILS # BLD AUTO: 10.6 10E9/L (ref 1.6–8.3)
NEUTROPHILS NFR BLD AUTO: 90.4 %
NRBC # BLD AUTO: 0 10*3/UL
NRBC BLD AUTO-RTO: 0 /100
PLATELET # BLD AUTO: 253 10E9/L (ref 150–450)
RBC # BLD AUTO: 4.93 10E12/L (ref 4.4–5.9)
WBC # BLD AUTO: 11.7 10E9/L (ref 4–11)

## 2020-02-17 PROCEDURE — 86200 CCP ANTIBODY: CPT | Performed by: INTERNAL MEDICINE

## 2020-02-17 PROCEDURE — 82565 ASSAY OF CREATININE: CPT | Performed by: INTERNAL MEDICINE

## 2020-02-17 PROCEDURE — 84460 ALANINE AMINO (ALT) (SGPT): CPT | Performed by: INTERNAL MEDICINE

## 2020-02-17 PROCEDURE — 85025 COMPLETE CBC W/AUTO DIFF WBC: CPT | Performed by: INTERNAL MEDICINE

## 2020-02-17 PROCEDURE — 86618 LYME DISEASE ANTIBODY: CPT | Performed by: INTERNAL MEDICINE

## 2020-02-17 PROCEDURE — 84450 TRANSFERASE (AST) (SGOT): CPT | Performed by: INTERNAL MEDICINE

## 2020-02-17 PROCEDURE — 36415 COLL VENOUS BLD VENIPUNCTURE: CPT | Performed by: INTERNAL MEDICINE

## 2020-02-17 PROCEDURE — 86140 C-REACTIVE PROTEIN: CPT | Performed by: INTERNAL MEDICINE

## 2020-02-18 LAB
B BURGDOR IGG+IGM SER QL: 0.1 (ref 0–0.89)
CCP AB SER IA-ACNC: 1 U/ML

## 2020-03-11 ENCOUNTER — HEALTH MAINTENANCE LETTER (OUTPATIENT)
Age: 30
End: 2020-03-11

## 2020-07-02 ENCOUNTER — MEDICAL CORRESPONDENCE (OUTPATIENT)
Dept: HEALTH INFORMATION MANAGEMENT | Facility: CLINIC | Age: 30
End: 2020-07-02

## 2020-07-02 DIAGNOSIS — Z79.52 LONG TERM CURRENT USE OF SYSTEMIC STEROIDS: Primary | ICD-10-CM

## 2020-07-02 DIAGNOSIS — M12.89 TRANSIENT ARTHROPATHY, MULTIPLE SITES: ICD-10-CM

## 2020-07-07 DIAGNOSIS — M12.89 TRANSIENT ARTHROPATHY, MULTIPLE SITES: ICD-10-CM

## 2020-07-07 DIAGNOSIS — Z79.52 LONG TERM CURRENT USE OF SYSTEMIC STEROIDS: ICD-10-CM

## 2020-07-07 LAB
ALT SERPL W P-5'-P-CCNC: 86 U/L (ref 0–70)
AST SERPL W P-5'-P-CCNC: 59 U/L (ref 0–45)
BASOPHILS # BLD AUTO: 0 10E9/L (ref 0–0.2)
BASOPHILS NFR BLD AUTO: 0.1 %
CREAT SERPL-MCNC: 0.84 MG/DL (ref 0.66–1.25)
CRP SERPL-MCNC: <2.9 MG/L (ref 0–8)
DIFFERENTIAL METHOD BLD: NORMAL
EOSINOPHIL # BLD AUTO: 0 10E9/L (ref 0–0.7)
EOSINOPHIL NFR BLD AUTO: 0.3 %
ERYTHROCYTE [DISTWIDTH] IN BLOOD BY AUTOMATED COUNT: 13.6 % (ref 10–15)
GFR SERPL CREATININE-BSD FRML MDRD: >90 ML/MIN/{1.73_M2}
HCT VFR BLD AUTO: 43.3 % (ref 40–53)
HGB BLD-MCNC: 15.4 G/DL (ref 13.3–17.7)
LYMPHOCYTES # BLD AUTO: 2 10E9/L (ref 0.8–5.3)
LYMPHOCYTES NFR BLD AUTO: 22.5 %
MCH RBC QN AUTO: 31.2 PG (ref 26.5–33)
MCHC RBC AUTO-ENTMCNC: 35.6 G/DL (ref 31.5–36.5)
MCV RBC AUTO: 88 FL (ref 78–100)
MONOCYTES # BLD AUTO: 0.4 10E9/L (ref 0–1.3)
MONOCYTES NFR BLD AUTO: 4.4 %
NEUTROPHILS # BLD AUTO: 6.5 10E9/L (ref 1.6–8.3)
NEUTROPHILS NFR BLD AUTO: 72.7 %
PLATELET # BLD AUTO: 219 10E9/L (ref 150–450)
RBC # BLD AUTO: 4.93 10E12/L (ref 4.4–5.9)
WBC # BLD AUTO: 9 10E9/L (ref 4–11)

## 2020-07-07 PROCEDURE — 85025 COMPLETE CBC W/AUTO DIFF WBC: CPT | Performed by: INTERNAL MEDICINE

## 2020-07-07 PROCEDURE — 86140 C-REACTIVE PROTEIN: CPT | Performed by: INTERNAL MEDICINE

## 2020-07-07 PROCEDURE — 82565 ASSAY OF CREATININE: CPT | Performed by: INTERNAL MEDICINE

## 2020-07-07 PROCEDURE — 84460 ALANINE AMINO (ALT) (SGPT): CPT | Performed by: INTERNAL MEDICINE

## 2020-07-07 PROCEDURE — 84450 TRANSFERASE (AST) (SGOT): CPT | Performed by: INTERNAL MEDICINE

## 2020-07-07 PROCEDURE — 36415 COLL VENOUS BLD VENIPUNCTURE: CPT | Performed by: INTERNAL MEDICINE

## 2020-07-14 ENCOUNTER — TRANSFERRED RECORDS (OUTPATIENT)
Dept: HEALTH INFORMATION MANAGEMENT | Facility: CLINIC | Age: 30
End: 2020-07-14

## 2020-09-23 ENCOUNTER — MEDICAL CORRESPONDENCE (OUTPATIENT)
Dept: HEALTH INFORMATION MANAGEMENT | Facility: CLINIC | Age: 30
End: 2020-09-23

## 2020-10-08 ENCOUNTER — TRANSFERRED RECORDS (OUTPATIENT)
Dept: HEALTH INFORMATION MANAGEMENT | Facility: CLINIC | Age: 30
End: 2020-10-08

## 2020-10-16 DIAGNOSIS — Z79.899 ENCOUNTER FOR LONG-TERM (CURRENT) USE OF MEDICATIONS: ICD-10-CM

## 2020-10-16 DIAGNOSIS — Z79.899 ENCOUNTER FOR LONG-TERM (CURRENT) USE OF MEDICATIONS: Primary | ICD-10-CM

## 2020-10-16 DIAGNOSIS — M06.9 RHEUMATIC JOINT DISEASE (H): ICD-10-CM

## 2020-10-16 DIAGNOSIS — Z79.52 LONG TERM CURRENT USE OF SYSTEMIC STEROIDS: ICD-10-CM

## 2020-10-16 LAB
ALT SERPL W P-5'-P-CCNC: 33 U/L (ref 0–70)
AST SERPL W P-5'-P-CCNC: 24 U/L (ref 0–45)
CREAT SERPL-MCNC: 0.96 MG/DL (ref 0.66–1.25)
CRP SERPL-MCNC: <2.9 MG/L (ref 0–8)
ERYTHROCYTE [DISTWIDTH] IN BLOOD BY AUTOMATED COUNT: 12 % (ref 10–15)
GFR SERPL CREATININE-BSD FRML MDRD: >90 ML/MIN/{1.73_M2}
HCT VFR BLD AUTO: 43.6 % (ref 40–53)
HGB BLD-MCNC: 15.3 G/DL (ref 13.3–17.7)
MCH RBC QN AUTO: 30.9 PG (ref 26.5–33)
MCHC RBC AUTO-ENTMCNC: 35.1 G/DL (ref 31.5–36.5)
MCV RBC AUTO: 88 FL (ref 78–100)
PLATELET # BLD AUTO: 164 10E9/L (ref 150–450)
RBC # BLD AUTO: 4.95 10E12/L (ref 4.4–5.9)
WBC # BLD AUTO: 6.5 10E9/L (ref 4–11)

## 2020-10-16 PROCEDURE — 86140 C-REACTIVE PROTEIN: CPT | Performed by: INTERNAL MEDICINE

## 2020-10-16 PROCEDURE — 84460 ALANINE AMINO (ALT) (SGPT): CPT | Performed by: INTERNAL MEDICINE

## 2020-10-16 PROCEDURE — 85027 COMPLETE CBC AUTOMATED: CPT | Performed by: INTERNAL MEDICINE

## 2020-10-16 PROCEDURE — 86481 TB AG RESPONSE T-CELL SUSP: CPT | Performed by: INTERNAL MEDICINE

## 2020-10-16 PROCEDURE — 36415 COLL VENOUS BLD VENIPUNCTURE: CPT | Performed by: INTERNAL MEDICINE

## 2020-10-16 PROCEDURE — 84450 TRANSFERASE (AST) (SGOT): CPT | Performed by: INTERNAL MEDICINE

## 2020-10-16 PROCEDURE — 82565 ASSAY OF CREATININE: CPT | Performed by: INTERNAL MEDICINE

## 2020-10-21 LAB
GAMMA INTERFERON BACKGROUND BLD IA-ACNC: 0.05 IU/ML
M TB IFN-G CD4+ BCKGRND COR BLD-ACNC: 9.95 IU/ML
M TB TUBERC IFN-G BLD QL: NEGATIVE
MITOGEN IGNF BCKGRD COR BLD-ACNC: 0 IU/ML
MITOGEN IGNF BCKGRD COR BLD-ACNC: 0.02 IU/ML

## 2021-01-04 ENCOUNTER — HEALTH MAINTENANCE LETTER (OUTPATIENT)
Age: 31
End: 2021-01-04

## 2021-04-25 ENCOUNTER — HEALTH MAINTENANCE LETTER (OUTPATIENT)
Age: 31
End: 2021-04-25

## 2021-10-10 ENCOUNTER — HEALTH MAINTENANCE LETTER (OUTPATIENT)
Age: 31
End: 2021-10-10

## 2022-05-22 ENCOUNTER — HEALTH MAINTENANCE LETTER (OUTPATIENT)
Age: 32
End: 2022-05-22

## 2022-09-18 ENCOUNTER — HEALTH MAINTENANCE LETTER (OUTPATIENT)
Age: 32
End: 2022-09-18

## 2022-09-20 DIAGNOSIS — N46.8 PRIMARY MALE INFERTILITY: Primary | ICD-10-CM

## 2022-12-01 ENCOUNTER — LAB (OUTPATIENT)
Dept: LAB | Facility: CLINIC | Age: 32
End: 2022-12-01
Payer: COMMERCIAL

## 2022-12-01 DIAGNOSIS — N46.8 PRIMARY MALE INFERTILITY: ICD-10-CM

## 2022-12-01 PROCEDURE — 89322 SEMEN ANAL STRICT CRITERIA: CPT

## 2022-12-05 LAB
ABNORMAL SPERM MORPHOLOGY: 93
ABSTINENCE DAYS: 5 DAYS (ref 2–7)
AGGLUTINATION: YES
ANALYSIS TEMP - CENTIGRADE: 22 CENTIGRADE
COLLECTION METHOD: ABNORMAL
COLLECTION SITE: ABNORMAL
CONSENT TO RELEASE TO PARTNER: YES
DAL- RECEIVED TIME: ABNORMAL
HEAD DEFECT: 93 %
IMMOTILE: 52 %
LIQUEFIED: YES
MIDPIECE DEFECT: 43 %
NON-PROGRESSIVE MOTILITY: 8 %
NORMAL SPERM MORPHOLOGY: 7 % NORMAL FORMS
PROGRESSIVE MOTILITY: 40 %
ROUND CELLS: 0.3 MILLION/ML
SPECIMEN PH: 7.6 PH
SPECIMEN VOLUME: 2.8 ML
SPERM CONCENTRATION: 271.5 MILLION/ML
TAIL DEFECT: 2 %
TIME OF ANALYSIS: ABNORMAL
TOTAL PROGRESSIVE MOTILE NUMBER: 304 MILLION
TOTAL SPERM NUMBER: 760 MILLION
VISCOUS: NO
VITALITY: ABNORMAL

## 2023-06-04 ENCOUNTER — HEALTH MAINTENANCE LETTER (OUTPATIENT)
Age: 33
End: 2023-06-04

## 2024-07-14 ENCOUNTER — HEALTH MAINTENANCE LETTER (OUTPATIENT)
Age: 34
End: 2024-07-14

## 2025-07-19 ENCOUNTER — HEALTH MAINTENANCE LETTER (OUTPATIENT)
Age: 35
End: 2025-07-19